# Patient Record
Sex: FEMALE | Race: WHITE
[De-identification: names, ages, dates, MRNs, and addresses within clinical notes are randomized per-mention and may not be internally consistent; named-entity substitution may affect disease eponyms.]

---

## 2017-05-12 ENCOUNTER — HOSPITAL ENCOUNTER (OUTPATIENT)
Age: 52
Discharge: HOME | End: 2017-05-12
Payer: COMMERCIAL

## 2017-05-12 DIAGNOSIS — Z12.31: Primary | ICD-10-CM

## 2017-07-06 ENCOUNTER — HOSPITAL ENCOUNTER (OUTPATIENT)
Dept: HOSPITAL 76 - SDS | Age: 52
Discharge: HOME | End: 2017-07-06
Attending: SURGERY
Payer: COMMERCIAL

## 2017-07-06 VITALS — SYSTOLIC BLOOD PRESSURE: 136 MMHG | DIASTOLIC BLOOD PRESSURE: 80 MMHG

## 2017-07-06 DIAGNOSIS — K21.9: ICD-10-CM

## 2017-07-06 DIAGNOSIS — Z82.61: ICD-10-CM

## 2017-07-06 DIAGNOSIS — Q27.33: ICD-10-CM

## 2017-07-06 DIAGNOSIS — Z82.49: ICD-10-CM

## 2017-07-06 DIAGNOSIS — I10: ICD-10-CM

## 2017-07-06 DIAGNOSIS — Z83.3: ICD-10-CM

## 2017-07-06 DIAGNOSIS — Z12.11: Primary | ICD-10-CM

## 2017-07-06 PROCEDURE — 0DJD8ZZ INSPECTION OF LOWER INTESTINAL TRACT, VIA NATURAL OR ARTIFICIAL OPENING ENDOSCOPIC: ICD-10-PCS | Performed by: SURGERY

## 2017-07-06 PROCEDURE — 45378 DIAGNOSTIC COLONOSCOPY: CPT

## 2018-01-29 ENCOUNTER — HOSPITAL ENCOUNTER (OUTPATIENT)
Dept: HOSPITAL 76 - EMS | Age: 53
End: 2018-01-29
Attending: SURGERY
Payer: COMMERCIAL

## 2018-01-29 DIAGNOSIS — R06.00: Primary | ICD-10-CM

## 2018-01-29 DIAGNOSIS — R05: ICD-10-CM

## 2018-06-10 ENCOUNTER — HOSPITAL ENCOUNTER (EMERGENCY)
Age: 53
Discharge: HOME | End: 2018-06-10
Payer: COMMERCIAL

## 2018-06-10 VITALS
DIASTOLIC BLOOD PRESSURE: 69 MMHG | TEMPERATURE: 97.16 F | SYSTOLIC BLOOD PRESSURE: 134 MMHG | OXYGEN SATURATION: 100 % | HEART RATE: 90 BPM | RESPIRATION RATE: 16 BRPM

## 2018-06-10 VITALS — BODY MASS INDEX: 27.9 KG/M2

## 2018-06-10 DIAGNOSIS — N93.9: Primary | ICD-10-CM

## 2018-06-10 PROCEDURE — 99282 EMERGENCY DEPT VISIT SF MDM: CPT

## 2018-06-10 NOTE — ED.FEMALEGU
"HPI - Female Genitourinary
General
Chief complaint: Vaginal Bleeding
Stated complaint: BLOOD IN URINE X5 DAYS
Time Seen by Provider: 06/10/18 22:42
Source: patient
Mode of arrival: ambulatory
Limitations: no limitations
History of Present Illness
HPI Narrative: 52-year-old female here for evaluation of vaginal bleeding.  Patient states that for the past 4 5 days she has had vaginal bleeding.  She states that 2 days ago she had heavy bleeding that improved yesterday but then returned again 
today.  States that there was 2 hr where she used 2 different pads.  She states that her last menstrual cycle was a month ago however she has been very irregular in the past.  Is not on any birth control.  Has not having any pain. No urinary 
symptoms.
Related Data
Home Medications

 Medication  Instructions  Recorded  Confirmed
No Known Home Medications  06/10/18 06/10/18



Review of Systems
Constitutional
Denies chills, Denies fever(s), Denies lethargy and Denies weakness
Cardiovascular
Denies chest pain, Denies irregular heart rhythm, Denies lightheadedness, Denies palpitations, Denies dyspnea, Denies dyspnea on exertion and Denies orthopnea
Respiratory
Denies cough, Denies dyspnea, Denies dyspnea on exertion and Denies wheezing
Gastrointestinal
Gastrointestinal: Denies abdominal pain, Denies change in bowel habits, Denies diarrhea, Denies nausea and Denies vomiting
Genitourinary
Reports abnormal menses, Reports metrorrhagia, Denies hot flashes, Denies dysuria, Denies flank pain and Denies vaginal odor
Integumentary/Breasts
Denies pruritus, Denies erythema, Denies rash and Denies wounds
Neurologic
Denies weakness
Endocrine
Denies palpitations
Hematologic/Lymphatic
Denies easy bruising
Allergic/Immunologic
Denies wheezing

Exam
Initial Vital Signs
Initial Vital Signs:  Vital Signs

Temperature  97.2 F L  06/10/18 22:52
Pulse Rate  90   06/10/18 22:52
Respiratory Rate  16   06/10/18 22:52
Blood Pressure  134/69 H  06/10/18 22:52
Pulse Oximetry  100   06/10/18 22:52


Resp
Effort & Inspection: normal respiratory effort, able to speak in complete sentences, no respiratory distress and no use of accessory muscles
Auscultation: clear to auscultation bilaterally, no rales, no rhonchi and no wheezes
Cardio
Rate: regular rate
Rhythm: regular rhythm
Heart Sounds: no click, no gallops, no murmurs and no rubs
Pulses: normal peripheral pulses
GI
Inspection: normal to inspection
Palpation: soft
Back/Spine/Pelvis
Back: No CVA tenderness
Skin
General: no rashes or lesions noted, No jaundice and No petechiae

Course
Vital Signs - 8 hr

 06/10/18
22:52
Temperature 97.2 F L
Pulse Rate 90
Respiratory Rate 16
Blood Pressure 134/69 H
Pulse Oximetry 100



MDM - Female Genitourinary
MDM Narrative
Medical decision making narrative: Patient has normal vital signs.  Does not have any urinary symptoms. Had just over 1 hr today where she had increase in blood to include clots otherwise her vaginal bleeding has been like normal menstrual cycles.  
Patient states she did not want come to the emergency department per her son made her come in.  I suspect that she is having irregular menses secondary to her age in the beginning of menopause.  We did discuss placing her on a birth control today to 
control the bleeding however since it was only 1 day will hold on that for now.  Will hold on any blood draw loss for now.  Patient was instructed that she needed to contact her primary doctor tomorrow to discuss a consult to see gyn.  Will hold on 
a pelvic exam today seeing as how I think it will not change disposition.  Patient expressed understanding and agreement with plan

Discharge Plan
Departure
Patient Disposition: Home, Self-Care

Clinical Impression:
 Abnormal vaginal bleeding


Discharge Date/Time: 06/10/18 23:40

Interventions:
ED Discharge Assessment   Last Done: 06/10/18 23:40

Instructions:  DI for Vaginal Bleeding

Activity Restrictions/Additional Instructi
[id number]|2018-06-10 22:58:00|2018-06-10 22:58:00|ED_ITS|[facility]|Emergency Department|[id number]|"HPI - General Adult

## 2019-05-22 ENCOUNTER — HOSPITAL ENCOUNTER (OUTPATIENT)
Dept: HOSPITAL 76 - DI.N | Age: 54
Discharge: HOME | End: 2019-05-22
Attending: STUDENT IN AN ORGANIZED HEALTH CARE EDUCATION/TRAINING PROGRAM
Payer: COMMERCIAL

## 2019-05-22 DIAGNOSIS — Z12.31: Primary | ICD-10-CM

## 2019-05-22 PROCEDURE — 77067 SCR MAMMO BI INCL CAD: CPT

## 2022-05-08 ENCOUNTER — HOSPITAL ENCOUNTER (OUTPATIENT)
Dept: HOSPITAL 76 - EMS | Age: 57
Discharge: LEFT BEFORE DISCHARGE | End: 2022-05-08
Payer: COMMERCIAL

## 2022-05-08 DIAGNOSIS — R07.89: Primary | ICD-10-CM

## 2022-05-08 DIAGNOSIS — U07.1: ICD-10-CM

## 2022-07-18 ENCOUNTER — HOSPITAL ENCOUNTER (OUTPATIENT)
Dept: HOSPITAL 76 - DI.N | Age: 57
Discharge: HOME | End: 2022-07-18
Attending: INTERNAL MEDICINE
Payer: COMMERCIAL

## 2022-07-18 DIAGNOSIS — Z12.31: Primary | ICD-10-CM

## 2023-03-27 ENCOUNTER — HOSPITAL ENCOUNTER (OUTPATIENT)
Dept: HOSPITAL 76 - EMS | Age: 58
End: 2023-03-27
Payer: COMMERCIAL

## 2023-03-27 DIAGNOSIS — R50.9: ICD-10-CM

## 2023-03-27 DIAGNOSIS — R53.1: Primary | ICD-10-CM

## 2023-03-28 ENCOUNTER — HOSPITAL ENCOUNTER (EMERGENCY)
Age: 58
Discharge: HOME | End: 2023-03-28
Payer: COMMERCIAL

## 2023-03-28 VITALS
DIASTOLIC BLOOD PRESSURE: 67 MMHG | TEMPERATURE: 98.78 F | HEART RATE: 97 BPM | RESPIRATION RATE: 18 BRPM | OXYGEN SATURATION: 98 % | SYSTOLIC BLOOD PRESSURE: 139 MMHG

## 2023-03-28 VITALS — BODY MASS INDEX: 28 KG/M2

## 2023-03-28 DIAGNOSIS — D72.829: ICD-10-CM

## 2023-03-28 DIAGNOSIS — R06.02: Primary | ICD-10-CM

## 2023-03-28 LAB
ADD MANUAL DIFF / SLIDE REVIEW: NO
ALBUMIN SERPL-MCNC: 4 G/DL (ref 3.5–5)
ALBUMIN/GLOB SERPL: 1.1 {RATIO} (ref 1–2.8)
ALP SERPL-CCNC: 95 U/L (ref 38–126)
ALT SERPL-CCNC: 26 IU/L (ref ?–35)
APPEARANCE UR: CLEAR
BILIRUBIN URINE UA: NEGATIVE
BUN SERPL-MCNC: 8 MG/DL (ref 7–17)
CALCIUM SERPL-MCNC: 8.6 MG/DL (ref 8.4–10.2)
CHLORIDE SERPL-SCNC: 103 MMOL/L (ref 98–107)
CK SERPL-CCNC: 26 U/L (ref 30–135)
CKMB % RELATIVE INDEX: (no result) % (ref 1.5–5)
CO2 SERPL-SCNC: 27 MMOL/L (ref 22–32)
COLOR UR: YELLOW
ESTIMATED GLOMERULAR FILT RATE: > 60 ML/MIN (ref 60–?)
GLOBULIN SER CALC-MCNC: 3.8 G/DL (ref 1.7–4.1)
GLUCOSE SERPL-MCNC: 112 MG/DL (ref 70–100)
GLUCOSE URINE UA: NEGATIVE G/DL
HEMATOCRIT: 34.4 % (ref 36–46)
HEMOGLOBIN: 11.2 G/DL (ref 12–16)
HEMOLYSIS: < 15 (ref 0–50)
HGB UR QL: (no result)
KETONES URINE UA: NEGATIVE
LACTATE SERPL-MCNC: 1 MMOL/L (ref 0.7–2.1)
LEUKOCYTE ESTERASE URINE UA: NEGATIVE
LIPASE SERPL-CCNC: 118 U/L (ref 23–300)
LYMPHOCYTES # SPEC AUTO: 2600 /UL (ref 1100–4500)
MCV RBC: 83.8 FL (ref 80–100)
MEAN CORPUSCULAR HEMOGLOBIN: 27.4 PG (ref 26–34)
MEAN CORPUSCULAR HGB CONC: 32.6 % (ref 30–36)
NITRITE URINE UA: NEGATIVE
PH UR: 6.5 [PH] (ref 4.5–8)
PLATELET COUNT: 614 X10^3/UL (ref 150–400)
POTASSIUM SERPL-SCNC: 3.7 MMOL/L (ref 3.4–5.1)
PROCALCITONIN SERPL-MCNC: 0.07 NG/ML (ref ?–0.5)
PROT SERPL-MCNC: 7.8 G/DL (ref 6.3–8.2)
PROTEIN URINE UA: NEGATIVE
SODIUM SERPL-SCNC: 136 MMOL/L (ref 137–145)
SP GR UR: <=1.005 (ref 1–1.03)
TROPONIN I SERPL-MCNC: < 0.012 NG/ML (ref 0.01–0.03)
UROBILINOGEN UR QL: 0.2 E.U./DL

## 2023-03-28 PROCEDURE — 85025 COMPLETE CBC W/AUTO DIFF WBC: CPT

## 2023-03-28 PROCEDURE — 87154 CUL TYP ID BLD PTHGN 6+ TRGT: CPT

## 2023-03-28 PROCEDURE — 80053 COMPREHEN METABOLIC PANEL: CPT

## 2023-03-28 PROCEDURE — 71045 X-RAY EXAM CHEST 1 VIEW: CPT

## 2023-03-28 PROCEDURE — 84484 ASSAY OF TROPONIN QUANT: CPT

## 2023-03-28 PROCEDURE — 84145 PROCALCITONIN (PCT): CPT

## 2023-03-28 PROCEDURE — 93005 ELECTROCARDIOGRAM TRACING: CPT

## 2023-03-28 PROCEDURE — 82550 ASSAY OF CK (CPK): CPT

## 2023-03-28 PROCEDURE — 83605 ASSAY OF LACTIC ACID: CPT

## 2023-03-28 PROCEDURE — 99283 EMERGENCY DEPT VISIT LOW MDM: CPT

## 2023-03-28 PROCEDURE — 36415 COLL VENOUS BLD VENIPUNCTURE: CPT

## 2023-03-28 PROCEDURE — 81001 URINALYSIS AUTO W/SCOPE: CPT

## 2023-03-28 PROCEDURE — 99284 EMERGENCY DEPT VISIT MOD MDM: CPT

## 2023-03-28 PROCEDURE — 83690 ASSAY OF LIPASE: CPT

## 2023-03-28 PROCEDURE — 87040 BLOOD CULTURE FOR BACTERIA: CPT

## 2023-12-11 ENCOUNTER — HOSPITAL ENCOUNTER (OUTPATIENT)
Dept: HOSPITAL 76 - DI.N | Age: 58
Discharge: HOME | End: 2023-12-11
Attending: INTERNAL MEDICINE
Payer: COMMERCIAL

## 2023-12-11 DIAGNOSIS — R92.323: ICD-10-CM

## 2023-12-11 DIAGNOSIS — Z12.31: Primary | ICD-10-CM

## 2023-12-11 DIAGNOSIS — Z80.3: ICD-10-CM

## 2024-07-16 ENCOUNTER — HOSPITAL ENCOUNTER (EMERGENCY)
Dept: HOSPITAL 76 - ED | Age: 59
Discharge: HOME | End: 2024-07-16
Payer: COMMERCIAL

## 2024-07-16 VITALS — OXYGEN SATURATION: 100 % | SYSTOLIC BLOOD PRESSURE: 142 MMHG | DIASTOLIC BLOOD PRESSURE: 79 MMHG

## 2024-07-16 DIAGNOSIS — N39.0: Primary | ICD-10-CM

## 2024-07-16 DIAGNOSIS — Z87.440: ICD-10-CM

## 2024-07-16 LAB
CLARITY UR REFRACT.AUTO: (no result)
GLUCOSE UR QL STRIP.AUTO: NEGATIVE MG/DL
KETONES UR QL STRIP.AUTO: NEGATIVE MG/DL
NITRITE UR QL STRIP.AUTO: NEGATIVE
PH UR STRIP.AUTO: 6 PH (ref 5–7.5)
PROT UR STRIP.AUTO-MCNC: (no result) MG/DL
RBC # UR STRIP.AUTO: (no result) /UL
RBC # URNS HPF: (no result) /HPF (ref 0–5)
SP GR UR STRIP.AUTO: 1.01 (ref 1–1.03)
SQUAMOUS URNS QL MICRO: (no result)
UROBILINOGEN UR QL STRIP.AUTO: (no result) E.U./DL
UROBILINOGEN UR STRIP.AUTO-MCNC: NEGATIVE MG/DL
WBC # UR MANUAL: >25 /HPF (ref 0–5)

## 2024-07-16 PROCEDURE — 81001 URINALYSIS AUTO W/SCOPE: CPT

## 2024-07-16 PROCEDURE — 87086 URINE CULTURE/COLONY COUNT: CPT

## 2024-07-16 PROCEDURE — 87077 CULTURE AEROBIC IDENTIFY: CPT

## 2024-07-16 PROCEDURE — 87181 SC STD AGAR DILUTION PER AGT: CPT

## 2024-07-16 PROCEDURE — 99283 EMERGENCY DEPT VISIT LOW MDM: CPT

## 2024-07-16 RX ADMIN — PHENAZOPYRIDINE STA MG: 100 TABLET ORAL at 05:53
